# Patient Record
Sex: FEMALE | Race: WHITE | NOT HISPANIC OR LATINO | Employment: FULL TIME | URBAN - METROPOLITAN AREA
[De-identification: names, ages, dates, MRNs, and addresses within clinical notes are randomized per-mention and may not be internally consistent; named-entity substitution may affect disease eponyms.]

---

## 2017-01-02 ENCOUNTER — PATIENT MESSAGE (OUTPATIENT)
Dept: OBSTETRICS AND GYNECOLOGY | Facility: CLINIC | Age: 37
End: 2017-01-02

## 2017-01-04 ENCOUNTER — TELEPHONE (OUTPATIENT)
Dept: OBSTETRICS AND GYNECOLOGY | Facility: CLINIC | Age: 37
End: 2017-01-04

## 2017-01-05 ENCOUNTER — OFFICE VISIT (OUTPATIENT)
Dept: SLEEP MEDICINE | Facility: CLINIC | Age: 37
End: 2017-01-05
Payer: COMMERCIAL

## 2017-01-05 VITALS
WEIGHT: 154.13 LBS | HEART RATE: 72 BPM | BODY MASS INDEX: 21.58 KG/M2 | HEIGHT: 71 IN | DIASTOLIC BLOOD PRESSURE: 76 MMHG | SYSTOLIC BLOOD PRESSURE: 108 MMHG

## 2017-01-05 DIAGNOSIS — G47.9 SLEEP DISTURBANCES: Primary | ICD-10-CM

## 2017-01-05 PROCEDURE — 1159F MED LIST DOCD IN RCRD: CPT | Mod: S$GLB,,, | Performed by: PSYCHIATRY & NEUROLOGY

## 2017-01-05 PROCEDURE — 99999 PR PBB SHADOW E&M-EST. PATIENT-LVL III: CPT | Mod: PBBFAC,,, | Performed by: PSYCHIATRY & NEUROLOGY

## 2017-01-05 PROCEDURE — 99213 OFFICE O/P EST LOW 20 MIN: CPT | Mod: S$GLB,,, | Performed by: PSYCHIATRY & NEUROLOGY

## 2017-01-05 RX ORDER — DEXTROAMPHETAMINE SACCHARATE, AMPHETAMINE ASPARTATE, DEXTROAMPHETAMINE SULFATE AND AMPHETAMINE SULFATE 7.5; 7.5; 7.5; 7.5 MG/1; MG/1; MG/1; MG/1
TABLET ORAL
Refills: 0 | COMMUNITY
Start: 2016-12-16 | End: 2017-05-31

## 2017-01-05 NOTE — PROGRESS NOTES
INITIAL HISTORY OF PRESENT ILLNESS:  Ila Jesus is a 36 y.o. female is here to be evaluated for a sleep disorder.       Sleep study results were reviewed in detail with patient.  Findings were put in context with her main sleep complaint which is fragmented sleep (frequent awakenings) and daytime attentional difficulties.  Attention concentration is improved on Adderall prescribed by psych.    Prior sleep history:  The patient's complaints include excessive daytime sleepiness, excessive daytime fatigue,  gasping for air in sleep and interrupted sleep, waking up with heart racing since  She gave birth to her son 2 years ago.  Reports vivd dreams - waking up gasping with her heart racing.  Recent difficulty with focus and memory    Prior long history of shift work.  She has always been a light sleeper.  She has a h/o depression and some ADD symptoms    Reports  dry mouth and sore throat  Denies nasal congestion   Denies  morning headaches  Reports  interrupted sleep  Denies frequent leg movements  Denies symptoms concerning for parasomnia    The ESS ([place] Sleepiness Score) taken on initial visit is 6 /24    The patient never had tonsillectomy, adenoidectomy or UPPP      SLEEP ROUTINE AND LIFESTYLE 01/05/2017 :  Occupation: 9 AM-5 PM in the office; prior night work  Bed partner:   Time to bed: MN  Sleep onset latency: 20-30 min - sometimes with Melatonin or very rarely once a month 0.5 mg Xanax  Disruptions or awakenings: 3-4 PM  Time to fall back into sleep: an hour  Wakeup time: 7 AM   Perceived sleep quality: 2/5  Perceived total sleep time:  5-6  hours.  Daytime naps: none  Weekend sleep routine: till 7 AM - still tired  Exercise routine: yes  Caffeine: 1-2 cup in AM, no sodas, no tea - sometimes ice tea    Past Medical History   Diagnosis Date    Neuromuscular disorder      carpal tunnel       Past Surgical History   Procedure Laterality Date    Wrist surgery       right wrist, carpel tunnel  "release     Wrist surgery       right     section  2014       Family History   Problem Relation Age of Onset    Breast cancer Paternal Grandmother 75    Breast cancer Paternal Aunt 50    Breast cancer Paternal Aunt 60    Colon cancer Neg Hx     Ovarian cancer Neg Hx        Social History   Substance Use Topics    Smoking status: Never Smoker    Smokeless tobacco: Never Used    Alcohol use Yes      Comment: not since + upt        ALLERGIES: Reviewed in EPIC    CURRENT MEDICATIONS: Reviewed in EPIC      PHYSICAL EXAM:  Visit Vitals    /76    Pulse 72    Ht 5' 11" (1.803 m)    Wt 69.9 kg (154 lb 1.6 oz)    BMI 21.49 kg/m2     I spent greater than 10 minutes during this 20 minute visit in counseling the patient regarding sleep study results, sleep disruption etiology, ramifications, and treatment options.      ASSESSMENT:    1. Sleep disturbances - non-specific; Normal sleep study    PLAN:    1. White noise  2. Listmaking  3. Stress reduction  4. Consider sleep restriction if sleep fragmentation not improved  5. Reassurance    Follow up as needed    "

## 2017-02-14 ENCOUNTER — OFFICE VISIT (OUTPATIENT)
Dept: OBSTETRICS AND GYNECOLOGY | Facility: CLINIC | Age: 37
End: 2017-02-14
Payer: COMMERCIAL

## 2017-02-14 VITALS
BODY MASS INDEX: 21.52 KG/M2 | DIASTOLIC BLOOD PRESSURE: 72 MMHG | WEIGHT: 153.69 LBS | SYSTOLIC BLOOD PRESSURE: 112 MMHG | HEIGHT: 71 IN

## 2017-02-14 DIAGNOSIS — N80.9 ENDOMETRIOSIS: Primary | Chronic | ICD-10-CM

## 2017-02-14 DIAGNOSIS — R10.2 PELVIC PAIN IN FEMALE: ICD-10-CM

## 2017-02-14 PROCEDURE — 99999 PR PBB SHADOW E&M-EST. PATIENT-LVL III: CPT | Mod: PBBFAC,,, | Performed by: OBSTETRICS & GYNECOLOGY

## 2017-02-14 PROCEDURE — 99214 OFFICE O/P EST MOD 30 MIN: CPT | Mod: S$GLB,,, | Performed by: OBSTETRICS & GYNECOLOGY

## 2017-02-14 RX ORDER — IBUPROFEN AND FAMOTIDINE 26.6; 8 MG/1; MG/1
1 TABLET ORAL 3 TIMES DAILY
Qty: 90 TABLET | Refills: 1 | Status: SHIPPED | OUTPATIENT
Start: 2017-02-14 | End: 2017-02-15

## 2017-02-14 NOTE — MR AVS SNAPSHOT
"    [facility] [address]  [address]  [facility] [address]  Phone: [phone]  Fax: [phone]                  Ila Jesus   2017 11:00 AM   Office Visit    Description:  Female : 1980   Provider:  Bi Bay IV, MD   Department:  [facility] [address]           Reason for Visit     Advice Only                To Do List           Goals (5 Years of Data)     None      [facility] On Call     [facility] On Call Nurse [facility] -  Assistance  Registered nurses in the [facility] On Call Center provide clinical advisement, health education, appointment booking, and other advisory services.  Call for this free service at [phone].             Medications           Message regarding Medications     Verify the changes and/or additions to your medication regime listed below are the same as discussed with your clinician today.  If any of these changes or additions are incorrect, please notify your healthcare provider.             Verify that the below list of medications is an accurate representation of the medications you are currently taking.  If none reported, the list may be blank. If incorrect, please contact your healthcare provider. Carry this list with you in case of emergency.           Current Medications     desogestrel-ethinyl estradiol (APRI) 0.15-0.03 mg per tablet Take 1 tablet by mouth once daily.    dextroamphetamine-amphetamine 30 mg Tab TAKE 1 TABLET BY MOUTH EVERY MORNING AND 1 TABLET EVERY AFTERNOON           Clinical Reference Information           Your Vitals Were     BP Height Weight Last Period BMI    112/72 5' 11" (1.803 m) 69.7 kg (153 lb 10.6 oz) 2017 21.43 kg/m2      Blood Pressure          Most Recent Value    BP  112/72      Allergies as of 2017     Sulfa (Sulfonamide Antibiotics)      Immunizations Administered on Date of Encounter - 2017     None      Language Assistance Services     ATTENTION: Language assistance " services are available, free of charge. Please call [phone].      ATENCIÓN: Si habla scar, tiene a dwyer disposición servicios gratuitos de asistencia lingüística. Llame al [phone].     CHÚ Ý: N?u b?n nói Ti?ng Vi?t, có các d?ch v? h? tr? ngôn ng? mi?n phí dành cho b?n. G?i s? [phone].         [facility] [address] complies with applicable Federal civil rights laws and does not discriminate on the basis of race, color, national origin, age, disability, or sex.

## 2017-02-14 NOTE — PROGRESS NOTES
CC:  Endometriosis and pelvic pain    HPI:  Ila Jesus is a 37 y.o. female patient  who presents today for discussion on endometriosis and discontinuing ocps in order to get pregnant.  Patient reports that pain and bleeding were severe when off ocps.  Patient is concerned about the recurrence of pain, bleeding, and possible decreased chance of getting pregnant.      Patient's last menstrual period was 2017.    Past Medical History   Diagnosis Date    Neuromuscular disorder      carpal tunnel       Past Surgical History   Procedure Laterality Date    Wrist surgery       right wrist, carpel tunnel release     Wrist surgery       right     section  2014         ROS:  GENERAL: Feeling well overall.   SKIN: Denies rash or lesions.   HEAD: Denies head injury or headache.   NODES: Denies enlarged lymph nodes.   CHEST: Denies chest pain or shortness of breath.   CARDIOVASCULAR: Denies palpitations or left sided chest pain.   ABDOMEN: No abdominal pain, nausea, vomiting or rectal bleeding.   URINARY: No dysuria, hematuria, or burning on urination.  REPRODUCTIVE: See HPI.   BREASTS: Denies pain, lumps, or nipple discharge.   HEMATOLOGIC: No easy bruisability or excessive bleeding.   MUSCULOSKELETAL: Denies joint pain or swelling.   NEUROLOGIC: Denies syncope or weakness.   PSYCHIATRIC: Denies depression.    PE:   Deferred  Diagnosis:  1. Endometriosis    2. Pelvic pain in female      PLAN:    Orders Placed This Encounter    ibuprofen-famotidine 800-26.6 mg Tab       Patient was counseled today on endometriosis and treatment options.    OCPs have been discontinued.  Patient instructed to keep a menstrual calendar and pain calendar.    Dicussed surgical treatment of endometriosis/resection of endometriosis.  Recommend surgical treatment if pain not responsive to Duexis/or patient unable to spontaneously get pregnant.    Follow-up:  PRN    Billing based on counseling time:  25-30  minutes.    Bi Bay IV, MD

## 2017-02-15 DIAGNOSIS — R10.2 PELVIC PAIN IN FEMALE: ICD-10-CM

## 2017-02-15 DIAGNOSIS — N80.9 ENDOMETRIOSIS: Chronic | ICD-10-CM

## 2017-02-15 RX ORDER — IBUPROFEN AND FAMOTIDINE 26.6; 8 MG/1; MG/1
1 TABLET ORAL 3 TIMES DAILY
Qty: 90 TABLET | Refills: 1 | Status: SHIPPED | OUTPATIENT
Start: 2017-02-15 | End: 2017-05-31

## 2017-05-15 ENCOUNTER — PATIENT MESSAGE (OUTPATIENT)
Dept: OBSTETRICS AND GYNECOLOGY | Facility: CLINIC | Age: 37
End: 2017-05-15

## 2017-05-15 ENCOUNTER — TELEPHONE (OUTPATIENT)
Dept: OBSTETRICS AND GYNECOLOGY | Facility: CLINIC | Age: 37
End: 2017-05-15

## 2017-05-15 DIAGNOSIS — Z36.9 UNSPECIFIED ANTENATAL SCREENING: Primary | ICD-10-CM

## 2017-05-31 ENCOUNTER — OFFICE VISIT (OUTPATIENT)
Dept: OBSTETRICS AND GYNECOLOGY | Facility: CLINIC | Age: 37
End: 2017-05-31
Payer: COMMERCIAL

## 2017-05-31 ENCOUNTER — HOSPITAL ENCOUNTER (OUTPATIENT)
Dept: RADIOLOGY | Facility: OTHER | Age: 37
Discharge: HOME | End: 2017-05-31
Attending: OBSTETRICS & GYNECOLOGY
Payer: COMMERCIAL

## 2017-05-31 VITALS
SYSTOLIC BLOOD PRESSURE: 98 MMHG | DIASTOLIC BLOOD PRESSURE: 60 MMHG | BODY MASS INDEX: 21.08 KG/M2 | WEIGHT: 150.56 LBS | HEIGHT: 71 IN

## 2017-05-31 DIAGNOSIS — Z36.9 UNSPECIFIED ANTENATAL SCREENING: ICD-10-CM

## 2017-05-31 DIAGNOSIS — N91.5 OLIGOMENORRHEA: Primary | ICD-10-CM

## 2017-05-31 PROBLEM — O26.899 RH NEGATIVE, ANTEPARTUM: Status: ACTIVE | Noted: 2017-05-31

## 2017-05-31 PROBLEM — Z67.91 RH NEGATIVE, ANTEPARTUM: Status: ACTIVE | Noted: 2017-05-31

## 2017-05-31 LAB
B-HCG UR QL: POSITIVE
CTP QC/QA: YES

## 2017-05-31 PROCEDURE — 99999 PR PBB SHADOW E&M-EST. PATIENT-LVL III: CPT | Mod: PBBFAC,,, | Performed by: NURSE PRACTITIONER

## 2017-05-31 PROCEDURE — 87086 URINE CULTURE/COLONY COUNT: CPT

## 2017-05-31 PROCEDURE — 76801 OB US < 14 WKS SINGLE FETUS: CPT | Mod: 26,,, | Performed by: RADIOLOGY

## 2017-05-31 PROCEDURE — 81025 URINE PREGNANCY TEST: CPT | Mod: QW,S$GLB,, | Performed by: NURSE PRACTITIONER

## 2017-05-31 PROCEDURE — 76801 OB US < 14 WKS SINGLE FETUS: CPT | Mod: TC

## 2017-05-31 PROCEDURE — 87591 N.GONORRHOEAE DNA AMP PROB: CPT

## 2017-05-31 PROCEDURE — 99214 OFFICE O/P EST MOD 30 MIN: CPT | Mod: S$GLB,,, | Performed by: NURSE PRACTITIONER

## 2017-05-31 NOTE — LETTER
May 31, 2017      Bi Bay IV, MD  [address]  [address]  [place] [facility] [address]           [facility] [address]  [address]  [place] [address]  Phone: [phone]  Fax: [phone]          Patient: Ila Jesus   MR Number: [id number]   YOB: 1980   Date of Visit: 5/31/2017       Dear Dr. Bi Bay IV:    Thank you for referring Ila Jesus to me for evaluation. Attached you will find relevant portions of my assessment and plan of care.    If you have questions, please do not hesitate to call me. I look forward to following Ila Jesus along with you.    Sincerely,    Heidi Cordero, NP    Enclosure  CC:  No Recipients    If you would like to receive this communication electronically, please contact [email or web address] or [phone] to request more information on [employer] Link access.    For providers and/or their staff who would like to refer a patient to [facility], please contact us through our one-stop-shop provider referral line, [facility], at [phone].    If you feel you have received this communication in error or would no longer like to receive these types of communications, please e-mail [email or web address]

## 2017-05-31 NOTE — PROGRESS NOTES
"  CC: Positive Pregnancy Test    HISTORY OF PRESENT ILLNESS:    Ila Jesus is a 37 y.o. female, ,  Presents today for a routine exam complaining of amenorrhea and positive home urine pregnancy test.  Patient's last menstrual period was 2017.   She is not currently on any contraception.  Reports nausea- no vomiting. Reports breast tenderness. Denies vaginal bleeding and pelvic pain.  Prior C/S X 1 for breech- need PP blood transfusion.  Works as an .      ROS:  GENERAL: No weight changes. No swelling. No fatigue. No fever.  CARDIOVASCULAR: No chest pain. No shortness of breath. No leg cramps.   NEUROLOGICAL: No headaches. No vision changes.  BREASTS: No pain. No lumps. No discharge.  ABDOMEN: No pain. No diarrhea. No constipation.  REPRODUCTIVE: No abnormal bleeding.   VULVA: No pain. No lesions. No itching.  VAGINA: No relaxation. No itching. No odor. No discharge. No lesions.  URINARY: No incontinence. No nocturia. No frequency. No dysuria.    MEDICATIONS AND ALLERGIES:  Reviewed        COMPREHENSIVE GYN HISTORY:  PAP History: Denies abnormal Paps.  Infection History: Denies STDs. Denies PID.  Benign History: Denies uterine fibroids. Denies ovarian cysts. Denies endometriosis. Denies other conditions.  Cancer History: Denies cervical cancer. Denies uterine cancer or hyperplasia. Denies ovarian cancer. Denies vulvar cancer or pre-cancer. Denies vaginal cancer or pre-cancer. Denies breast cancer. Denies colon cancer.  Sexual Activity History: Reports currently being sexually active  Menstrual History: None.  Contraception: None    BP 98/60   Ht 5' 11" (1.803 m)   Wt 68.3 kg (150 lb 9.2 oz)   LMP 2017   BMI 21.00 kg/m²     PE:  AFFECT: Calm, alert and oriented X 3. Interactive during exam  GENERAL: Appears well-nourished, well-developed, in no acute distress.  HEAD: Normocephalic, atruamatic  TEETH: Good dentition.  THYROID: No thyromegally   BREASTS: No masses, skin " changes, nipple discharge or adenopathy bilaterally.  SKIN: Normal for race, warm, & dry. No lesions or rashes.  LUNGS: Easy and unlabored, clear to auscultation bilaterally.  HEART: Regular rate and rhythm   ABDOMEN: Soft and nontender without masses or organomegally.  VULVA: No lesions, masses or tenderness.  VAGINA: Moist and well rugated without lesions or discharge.  CERVIX: Moist and pink without lesions, discharge or tenderness.      UTERUS SIZE: 8 week size, nontender and without masses.  ADNEXA: No masses or tenderness.  ESTIMATE OF PELVIC CAPACITY: Adequate  EXTREMITIES: No cyanosis, clubbing or edema. No calf tenderness.  LYMPH NODES: No axillary or inguinal adenopathy.    PROCEDURES:  UPT Positive  Genprobe        ASSESSMENT/PLAN:  Amenorrhea  Positive urine pregnancy test (JUS: 18, EGA: 8w4d based on LMP)    -  Routine prenatal care    Nausea and vomiting in pregnancy    -  Education regarding lifestyle and dietary modifications    -  Advised use of B6/Unisom. Pt will notify us if no relief/worsening symptoms, will consider Zofran if needed.      1st TRIMESTER COUNSELING: Discussed all, booklet provided:  Common complaints of pregnancy  HIV and other routine prenatal tests including  genetic screening  Risk factors identified by prenatal history  Oriented to practice - discussed anticipated course of prenatal care & indications for Ultrasound  Childbirth classes/Hospital facilities   Nutrition and weight gain counseling  Toxoplasmosis precautions (Cats/Raw Meat)  Sexual activity and exercise  Environmental/Work hazards  Travel  Tobacco (Ask, Advise, Assess, Assist, and Arrange), as well as alcohol and drug use  Use of any medications (Including supplements, Vitamins, Herbs, or OTC Drugs)  Domestic violence  Seat belt use      TERATOLOGY COUNSELING:   Discussed indications and options for aneuploidy screening - pamphlets given    -  Pt desires [id number] and info for Open[employer] given to  determine cost/ coverage    Dating US later today  FOLLOW-UP in 4 weeks with Dr. Phu Cordero NP    OB/GYN

## 2017-06-01 ENCOUNTER — LAB VISIT (OUTPATIENT)
Dept: LAB | Facility: OTHER | Age: 37
End: 2017-06-01
Attending: OBSTETRICS & GYNECOLOGY
Payer: COMMERCIAL

## 2017-06-01 ENCOUNTER — PATIENT MESSAGE (OUTPATIENT)
Dept: OBSTETRICS AND GYNECOLOGY | Facility: CLINIC | Age: 37
End: 2017-06-01

## 2017-06-01 ENCOUNTER — TELEPHONE (OUTPATIENT)
Dept: OBSTETRICS AND GYNECOLOGY | Facility: CLINIC | Age: 37
End: 2017-06-01

## 2017-06-01 DIAGNOSIS — O20.0 PREGNANCY WITH EARLY THREATENED ABORTION: ICD-10-CM

## 2017-06-01 DIAGNOSIS — O20.0 PREGNANCY WITH EARLY THREATENED ABORTION: Primary | ICD-10-CM

## 2017-06-01 LAB
BACTERIA UR CULT: NORMAL
C TRACH DNA SPEC QL NAA+PROBE: NOT DETECTED
HCG INTACT+B SERPL-ACNC: NORMAL MIU/ML
N GONORRHOEA DNA SPEC QL NAA+PROBE: NOT DETECTED

## 2017-06-01 PROCEDURE — 36415 COLL VENOUS BLD VENIPUNCTURE: CPT

## 2017-06-01 PROCEDURE — 84702 CHORIONIC GONADOTROPIN TEST: CPT

## 2017-06-01 NOTE — TELEPHONE ENCOUNTER
Pt notified u/s results, did not show fetal pole or yolk sac. By LMP should be 8 weeks 5 days. Patient informed if bleeding occurs will need Rhogam, can report to ed for heavy bleeding, filling pad with in 1 hour. Pending labs will proceed with care of u/s or repeat labs

## 2017-06-02 ENCOUNTER — TELEPHONE (OUTPATIENT)
Dept: OBSTETRICS AND GYNECOLOGY | Facility: CLINIC | Age: 37
End: 2017-06-02

## 2017-06-02 DIAGNOSIS — O20.0 THREATENED ABORTION AFFECTING INTRAUTERINE PREGNANCY: Primary | ICD-10-CM

## 2017-06-02 NOTE — TELEPHONE ENCOUNTER
Per Dr. Bay, informed patient of HCG results, repeat Monday, pending results will contact patient with recommendations. Patient reports first + upt 5/5/17

## 2017-06-05 ENCOUNTER — TELEPHONE (OUTPATIENT)
Dept: OBSTETRICS AND GYNECOLOGY | Facility: CLINIC | Age: 37
End: 2017-06-05

## 2017-06-05 ENCOUNTER — LAB VISIT (OUTPATIENT)
Dept: LAB | Facility: OTHER | Age: 37
End: 2017-06-05
Attending: OBSTETRICS & GYNECOLOGY
Payer: COMMERCIAL

## 2017-06-05 DIAGNOSIS — O20.0 THREATENED ABORTION AFFECTING INTRAUTERINE PREGNANCY: ICD-10-CM

## 2017-06-05 DIAGNOSIS — O02.1 MISSED AB: Primary | ICD-10-CM

## 2017-06-05 LAB — HCG INTACT+B SERPL-ACNC: NORMAL MIU/ML

## 2017-06-05 PROCEDURE — 84702 CHORIONIC GONADOTROPIN TEST: CPT

## 2017-06-05 PROCEDURE — 36415 COLL VENOUS BLD VENIPUNCTURE: CPT

## 2017-06-05 NOTE — TELEPHONE ENCOUNTER
Pt notified of hcg, started bleeding today, rhogam and blood work scheduled tomorrow. Patient considering D&C, will contact office with desires, weekly HCG per Dr. Bay if declines surgical intervention

## 2017-06-06 ENCOUNTER — LAB VISIT (OUTPATIENT)
Dept: LAB | Facility: OTHER | Age: 37
End: 2017-06-06
Attending: OBSTETRICS & GYNECOLOGY
Payer: COMMERCIAL

## 2017-06-06 ENCOUNTER — CLINICAL SUPPORT (OUTPATIENT)
Dept: OBSTETRICS AND GYNECOLOGY | Facility: CLINIC | Age: 37
End: 2017-06-06
Payer: COMMERCIAL

## 2017-06-06 ENCOUNTER — TELEPHONE (OUTPATIENT)
Dept: OBSTETRICS AND GYNECOLOGY | Facility: CLINIC | Age: 37
End: 2017-06-06

## 2017-06-06 DIAGNOSIS — O02.1 MISSED ABORTION: Primary | ICD-10-CM

## 2017-06-06 DIAGNOSIS — O02.1 MISSED AB: ICD-10-CM

## 2017-06-06 DIAGNOSIS — Z29.13 NEED FOR RHOGAM DUE TO RH NEGATIVE MOTHER: Primary | ICD-10-CM

## 2017-06-06 LAB
ABO + RH BLD: NORMAL
BLD GP AB SCN CELLS X3 SERPL QL: NORMAL

## 2017-06-06 PROCEDURE — 96372 THER/PROPH/DIAG INJ SC/IM: CPT | Mod: S$GLB,,, | Performed by: OBSTETRICS & GYNECOLOGY

## 2017-06-06 PROCEDURE — 86901 BLOOD TYPING SEROLOGIC RH(D): CPT

## 2017-06-06 PROCEDURE — 86900 BLOOD TYPING SEROLOGIC ABO: CPT

## 2017-06-06 PROCEDURE — 36415 COLL VENOUS BLD VENIPUNCTURE: CPT

## 2017-06-06 NOTE — TELEPHONE ENCOUNTER
F/u call regarding missed AB. Patient contacted reports having some bleeding, she is fine with allowing to occur naturally. Patient is scheduled for HCG 6/12/17. Patient report temp a few hours after receiving rhogam, has since resolved.

## 2017-06-06 NOTE — PROGRESS NOTES
Here for rhogam injection , no complaints at this time. Verified patient is RH negative.   No complaint of pain before or after injection. Patient advised to wait 15 minutes before leaving and report any adverse reactions.      Site: RB

## 2017-06-12 ENCOUNTER — LAB VISIT (OUTPATIENT)
Dept: LAB | Facility: OTHER | Age: 37
End: 2017-06-12
Attending: OBSTETRICS & GYNECOLOGY
Payer: COMMERCIAL

## 2017-06-12 ENCOUNTER — TELEPHONE (OUTPATIENT)
Dept: OBSTETRICS AND GYNECOLOGY | Facility: CLINIC | Age: 37
End: 2017-06-12

## 2017-06-12 DIAGNOSIS — O02.1 MISSED ABORTION: ICD-10-CM

## 2017-06-12 DIAGNOSIS — O03.4 INCOMPLETE ABORTION: Primary | ICD-10-CM

## 2017-06-12 LAB — HCG INTACT+B SERPL-ACNC: 1483 MIU/ML

## 2017-06-12 PROCEDURE — 36415 COLL VENOUS BLD VENIPUNCTURE: CPT

## 2017-06-12 PROCEDURE — 84702 CHORIONIC GONADOTROPIN TEST: CPT

## 2017-06-19 ENCOUNTER — LAB VISIT (OUTPATIENT)
Dept: LAB | Facility: OTHER | Age: 37
End: 2017-06-19
Attending: OBSTETRICS & GYNECOLOGY
Payer: COMMERCIAL

## 2017-06-19 DIAGNOSIS — O03.4 INCOMPLETE ABORTION: ICD-10-CM

## 2017-06-19 LAB — HCG INTACT+B SERPL-ACNC: 240 MIU/ML

## 2017-06-19 PROCEDURE — 84702 CHORIONIC GONADOTROPIN TEST: CPT

## 2017-06-19 PROCEDURE — 36415 COLL VENOUS BLD VENIPUNCTURE: CPT

## 2017-06-21 ENCOUNTER — TELEPHONE (OUTPATIENT)
Dept: OBSTETRICS AND GYNECOLOGY | Facility: CLINIC | Age: 37
End: 2017-06-21

## 2017-06-21 DIAGNOSIS — O02.1 MISSED ABORTION: Primary | ICD-10-CM

## 2017-06-21 NOTE — TELEPHONE ENCOUNTER
Patient informed HCG level, per Dr. Bay check in 1-2 weeks. Patient scheduled 6/30 @ 930. Patient c/o cold symptoms and feeling uninterested in daily activities. Counseled patient on exercise and eating well. Patient to contact office if desires SSRI.

## 2017-06-22 ENCOUNTER — PATIENT MESSAGE (OUTPATIENT)
Dept: OBSTETRICS AND GYNECOLOGY | Facility: CLINIC | Age: 37
End: 2017-06-22

## 2017-07-03 ENCOUNTER — TELEPHONE (OUTPATIENT)
Dept: OBSTETRICS AND GYNECOLOGY | Facility: CLINIC | Age: 37
End: 2017-07-03

## 2017-07-06 ENCOUNTER — TELEPHONE (OUTPATIENT)
Dept: OBSTETRICS AND GYNECOLOGY | Facility: CLINIC | Age: 37
End: 2017-07-06

## 2017-07-06 DIAGNOSIS — O02.1 MISSED AB: Primary | ICD-10-CM

## 2018-01-08 ENCOUNTER — PATIENT MESSAGE (OUTPATIENT)
Dept: OBSTETRICS AND GYNECOLOGY | Facility: CLINIC | Age: 38
End: 2018-01-08

## 2019-02-26 ENCOUNTER — OFFICE VISIT (OUTPATIENT)
Dept: DERMATOLOGY | Facility: CLINIC | Age: 39
End: 2019-02-26
Payer: COMMERCIAL

## 2019-02-26 DIAGNOSIS — D22.30 MELANOCYTIC NEVI OF FACE: ICD-10-CM

## 2019-02-26 DIAGNOSIS — Z80.8 FAMILY HISTORY OF MELANOMA: ICD-10-CM

## 2019-02-26 DIAGNOSIS — D22.5 MELANOCYTIC NEVI OF TRUNK: ICD-10-CM

## 2019-02-26 DIAGNOSIS — D48.5 NEOPLASM OF UNCERTAIN BEHAVIOR OF SKIN: Primary | ICD-10-CM

## 2019-02-26 DIAGNOSIS — L30.9 HAND ECZEMA: ICD-10-CM

## 2019-02-26 DIAGNOSIS — D22.61 MELANOCYTIC NEVUS OF RIGHT UPPER EXTREMITY: ICD-10-CM

## 2019-02-26 PROCEDURE — 88305 TISSUE SPECIMEN TO PATHOLOGY, DERMATOLOGY: ICD-10-PCS | Mod: 26,,, | Performed by: PATHOLOGY

## 2019-02-26 PROCEDURE — 88305 TISSUE EXAM BY PATHOLOGIST: CPT | Mod: 26,,, | Performed by: PATHOLOGY

## 2019-02-26 PROCEDURE — 11300 SHAVE SKIN LESION 0.5 CM/<: CPT | Mod: 51,S$GLB,, | Performed by: DERMATOLOGY

## 2019-02-26 PROCEDURE — 11300 PR SHAV SKIN LES < 0.5 CM TRUNK,ARM,LEG: ICD-10-PCS | Mod: 51,S$GLB,, | Performed by: DERMATOLOGY

## 2019-02-26 PROCEDURE — 11301 PR SHAV SKIN LES 0.6-1.0 CM TRUNK,ARM,LEG: ICD-10-PCS | Mod: S$GLB,,, | Performed by: DERMATOLOGY

## 2019-02-26 PROCEDURE — 88342 IMHCHEM/IMCYTCHM 1ST ANTB: CPT | Mod: 26,,, | Performed by: PATHOLOGY

## 2019-02-26 PROCEDURE — 11301 SHAVE SKIN LESION 0.6-1.0 CM: CPT | Mod: S$GLB,,, | Performed by: DERMATOLOGY

## 2019-02-26 PROCEDURE — 99203 OFFICE O/P NEW LOW 30 MIN: CPT | Mod: 25,S$GLB,, | Performed by: DERMATOLOGY

## 2019-02-26 PROCEDURE — 88342 TISSUE SPECIMEN TO PATHOLOGY, DERMATOLOGY: ICD-10-PCS | Mod: 26,,, | Performed by: PATHOLOGY

## 2019-02-26 PROCEDURE — 88305 TISSUE EXAM BY PATHOLOGIST: CPT | Performed by: PATHOLOGY

## 2019-02-26 PROCEDURE — 99203 PR OFFICE/OUTPT VISIT, NEW, LEVL III, 30-44 MIN: ICD-10-PCS | Mod: 25,S$GLB,, | Performed by: DERMATOLOGY

## 2019-02-26 RX ORDER — FLUOCINONIDE 0.5 MG/G
CREAM TOPICAL
Qty: 15 G | Refills: 1 | Status: SHIPPED | OUTPATIENT
Start: 2019-02-26

## 2019-02-26 NOTE — PROGRESS NOTES
Subjective:       Patient ID:  Ila Jesus is a 39 y.o. female who presents for   Chief Complaint   Patient presents with    Mole     FBSE    Spot     L index finger    Growth     neck     Mole  - Initial  Affected locations: diffuse  Duration: 10 years  Severity: mild  Timing: constant  Aggravated by: nothing  Relieving factors/Treatments tried: nothing  Improvement on treatment: no relief    Spot  - Initial  Affected locations: left fingers  Duration: 3 days  Signs / symptoms: dryness  Severity: mild  Timing: constant  Aggravated by: nothing  Relieving factors/Treatments tried: OTC moisturizer  Improvement on treatment: no relief    Growth  - Initial  Affected locations: neck  Duration: 3 years  Signs / symptoms: growing and spreading  Severity: mild to moderate  Timing: constant  Aggravated by: pressure and friction  Improvement on treatment: no relief      Review of Systems   Musculoskeletal: Negative for joint swelling and arthralgias.   Skin: Negative for recent sunburn.   Hematologic/Lymphatic: Does not bruise/bleed easily.        Objective:    Physical Exam   Constitutional: She appears well-developed and well-nourished. No distress.   Neurological: She is alert and oriented to person, place, and time. She is not disoriented.   Psychiatric: She has a normal mood and affect.   Skin:   Areas Examined (abnormalities noted in diagram):   Scalp / Hair Palpated and Inspected  Head / Face Inspection Performed  Neck Inspection Performed  Chest / Axilla Inspection Performed  Abdomen Inspection Performed  Genitals / Buttocks / Groin Inspection Performed  Back Inspection Performed  RUE Inspected  LUE Inspection Performed  RLE Inspected  LLE Inspection Performed  Nails and Digits Inspection Performed                       Diagram Legend     Erythematous scaling macule/papule c/w actinic keratosis       Vascular papule c/w angioma      Pigmented verrucoid papule/plaque c/w seborrheic keratosis      Yellow  umbilicated papule c/w sebaceous hyperplasia      Irregularly shaped tan macule c/w lentigo     1-2 mm smooth white papules consistent with Milia      Movable subcutaneous cyst with punctum c/w epidermal inclusion cyst      Subcutaneous movable cyst c/w pilar cyst      Firm pink to brown papule c/w dermatofibroma      Pedunculated fleshy papule(s) c/w skin tag(s)      Evenly pigmented macule c/w junctional nevus     Mildly variegated pigmented, slightly irregular-bordered macule c/w mildly atypical nevus      Flesh colored to evenly pigmented papule c/w intradermal nevus       Pink pearly papule/plaque c/w basal cell carcinoma      Erythematous hyperkeratotic cursted plaque c/w SCC      Surgical scar with no sign of skin cancer recurrence      Open and closed comedones      Inflammatory papules and pustules      Verrucoid papule consistent consistent with wart     Erythematous eczematous patches and plaques     Dystrophic onycholytic nail with subungual debris c/w onychomycosis     Umbilicated papule    Erythematous-base heme-crusted tan verrucoid plaque consistent with inflamed seborrheic keratosis     Erythematous Silvery Scaling Plaque c/w Psoriasis     See annotation      Assessment / Plan:      Pathology Orders:     Normal Orders This Visit    Tissue Specimen To Pathology, Dermatology     Questions:    Directional Terms:  Other(comment)    Clinical Information:  r/o dysplastic nevus    Specific Site:  right upper abdomen    Tissue Specimen To Pathology, Dermatology     Questions:    Directional Terms:  Left    Clinical Information:  r/o dysplastic nevus    Specific Site:  left medial calf        Neoplasm of uncertain behavior of skin  -     Tissue Specimen To Pathology, Dermatology  -     Tissue Specimen To Pathology, Dermatology  Shave removal procedure note:  Risk, benefits, and alternatives of shave removal are discussed with the patient, including risk of infection, scar, recurrence, and need for additional  treatment of site. The patient agrees to the procedure by verbal consent. The area is marked and prepped with alcohol.  Approximately 1 mL of lidocaine 1% with epinephrine is used for local anesthesia. A sharp blade is used to remove the entire lesion with a minimal margin of normal appearing skin. The specimen is sent to pathology for histologic confirmation. Hemostasis is obtained with aluminum chloride and/or monopolar hyfrecation if needed. The area is then dressed and bandaged. The patient tolerated the procedure well without adverse event. Written instructions on wound care were given and were reviewed with the patient, who is to call for any signs of bleeding or infection. The patient will be notified of the pathology results.  Defect: 6 x 5 mm    Shave removal procedure note:  Risk, benefits, and alternatives of shave removal are discussed with the patient, including risk of infection, scar, recurrence, and need for additional treatment of site. The patient agrees to the procedure by verbal consent. The area is marked and prepped with alcohol.  Approximately 1 mL of lidocaine 1% with epinephrine is used for local anesthesia. A sharp blade is used to remove the entire lesion with a minimal margin of normal appearing skin. The specimen is sent to pathology for histologic confirmation. Hemostasis is obtained with aluminum chloride and/or monopolar hyfrecation if needed. The area is then dressed and bandaged. The patient tolerated the procedure well without adverse event. Written instructions on wound care were given and were reviewed with the patient, who is to call for any signs of bleeding or infection. The patient will be notified of the pathology results.  Defect: 4 x 3 mm    Melanocytic nevi of face  Melanocytic nevi of trunk  Melanocytic nevus of right upper extremity  Multiple benign-appearing nevi present on exam today. Reassurance provided. Counseled patient to periodically examine moles and return to  clinic if any changes in size, shape, or color are noted or if it becomes symptomatic (bleeding, itching, pain, etc).    Family history of melanoma  Total body skin examination performed today including at least 12 points as noted in physical examination. Suspicious lesions biopsied as noted above.  Patient instructed in importance of daily broad-spectrum sunscreen use with SPF of at least 30. Sun avoidance and topical protection/protective clothing discussed.    Hand eczema  -     fluocinonide 0.05% (LIDEX) 0.05 % cream; AAA of finger bid prn eczema  Dispense: 15 g; Refill: 1  -  Discussed with patient the importance of frequent hand moisturization every hour and after every hand washing.   -  Recommended washing with Dove sensitive or other fragrance-free cleanser and moisturizing with CeraVe cream or ointment.   -  Can apply prescribed topical steroid followed by vaseline and white cotton glove or warm damp towel qhs prn severe flare.        Follow-up in about 1 year (around 2/26/2020) for TBSE, or sooner dependent on pathology results.

## 2019-08-15 ENCOUNTER — OFFICE VISIT (OUTPATIENT)
Dept: DERMATOLOGY | Facility: CLINIC | Age: 39
End: 2019-08-15
Payer: COMMERCIAL

## 2019-08-15 DIAGNOSIS — L24.3 IRRITANT CONTACT DERMATITIS DUE TO COSMETICS: ICD-10-CM

## 2019-08-15 DIAGNOSIS — L73.9 FOLLICULITIS: Primary | ICD-10-CM

## 2019-08-15 PROCEDURE — 99214 OFFICE O/P EST MOD 30 MIN: CPT | Mod: S$GLB,,, | Performed by: DERMATOLOGY

## 2019-08-15 PROCEDURE — 99214 PR OFFICE/OUTPT VISIT, EST, LEVL IV, 30-39 MIN: ICD-10-PCS | Mod: S$GLB,,, | Performed by: DERMATOLOGY

## 2019-08-15 RX ORDER — TRIAMCINOLONE ACETONIDE 1 MG/G
CREAM TOPICAL
Qty: 160 G | Refills: 3 | Status: SHIPPED | OUTPATIENT
Start: 2019-08-15

## 2019-08-15 NOTE — PROGRESS NOTES
Subjective:       Patient ID:  Ila Jesus is a 39 y.o. female who presents for   Chief Complaint   Patient presents with    Rash     lower legs     Rash  - Initial  Affected locations: left lower leg and right lower leg  Duration: 3 months  Signs / symptoms: itching, redness, dryness and irritated  Severity: mild to moderate  Timing: intermittent  Aggravated by: scratching and shaving  Relieving factors/Treatments tried: OTC moisturizer  Improvement on treatment: mild      Review of Systems   Musculoskeletal: Negative for joint swelling and arthralgias.   Skin: Positive for itching, rash and dry skin.   Hematologic/Lymphatic: Does not bruise/bleed easily.        Objective:    Physical Exam   Constitutional: She appears well-developed and well-nourished. No distress.   Neurological: She is alert and oriented to person, place, and time. She is not disoriented.   Psychiatric: She has a normal mood and affect.   Skin:   Areas Examined (abnormalities noted in diagram):   Head / Face Inspection Performed  RUE Inspected  LUE Inspection Performed  RLE Inspected  LLE Inspection Performed              Diagram Legend     Erythematous scaling macule/papule c/w actinic keratosis       Vascular papule c/w angioma      Pigmented verrucoid papule/plaque c/w seborrheic keratosis      Yellow umbilicated papule c/w sebaceous hyperplasia      Irregularly shaped tan macule c/w lentigo     1-2 mm smooth white papules consistent with Milia      Movable subcutaneous cyst with punctum c/w epidermal inclusion cyst      Subcutaneous movable cyst c/w pilar cyst      Firm pink to brown papule c/w dermatofibroma      Pedunculated fleshy papule(s) c/w skin tag(s)      Evenly pigmented macule c/w junctional nevus     Mildly variegated pigmented, slightly irregular-bordered macule c/w mildly atypical nevus      Flesh colored to evenly pigmented papule c/w intradermal nevus       Pink pearly papule/plaque c/w basal cell carcinoma       Erythematous hyperkeratotic cursted plaque c/w SCC      Surgical scar with no sign of skin cancer recurrence      Open and closed comedones      Inflammatory papules and pustules      Verrucoid papule consistent consistent with wart     Erythematous eczematous patches and plaques     Dystrophic onycholytic nail with subungual debris c/w onychomycosis     Umbilicated papule    Erythematous-base heme-crusted tan verrucoid plaque consistent with inflamed seborrheic keratosis     Erythematous Silvery Scaling Plaque c/w Psoriasis     See annotation      Assessment / Plan:        Irritant folliculitis  Irritant contact dermatitis due to cosmetics  -     triamcinolone acetonide 0.1% (KENALOG) 0.1 % cream; Apply to affected areas of body BID prn rash. Do not use on face, underarms, or groin.  Dispense: 160 g; Refill: 3  Rec'd Vanicream shave cream. Avoid fragrance.    Follow up in about 4 weeks (around 9/12/2019) for follow up, or sooner if symptoms worsening or not improving.